# Patient Record
Sex: FEMALE | Race: WHITE
[De-identification: names, ages, dates, MRNs, and addresses within clinical notes are randomized per-mention and may not be internally consistent; named-entity substitution may affect disease eponyms.]

---

## 2018-09-28 NOTE — CR
Barium enema

 

Preliminary view of the abdomen was obtained which is unremarkable.

 

Air contrast barium enema was performed. Colon was outlined from the 

rectosigmoid to the cecum.  No reflux into the terminal ileum is seen.

 

Diverticulosis is seen most severe within the sigmoid colon.  No 

discrete intraluminal polyp is identified.  No annular constricting 

lesion is seen.

 

Impression:

1.  Diverticulosis most severe within sigmoid colon.

2.  No additional abnormality is appreciated on air contrast barium 

enema study.

 

Diagnostic code #2

## 2018-09-28 NOTE — [id number]
Post Anesthesia Note





- EVALUATION WITHIN 48HRS OF ANESTHETIC


Vital Signs in Normal Range: Yes


Patient Participated in Evaluation: Yes


Respiratory Function Stable: Yes


Airway Patent: Yes


Cardiovascular Function Stable: Yes


Hydration Status Stable: Yes


Pain Control Satisfactory: Yes


Nausea and Vomiting Control Satisfactory: Yes


Mental Status Recovered: Yes


Pulse Rate: 68


SaO2: 99


Resp Rate: 16


Temperature: 36.7 C


Blood Pressure: 113/82

## 2018-09-28 NOTE — PCM.PREANE
Preanesthetic Assessment





- Procedure


Proposed Procedure: 





Surveillance colonoscopy





- Anesthesia/Transfusion/Family Hx


Anesthesia History: Prior Anesthesia Without Reaction


Family History of Anesthesia Reaction: No


Transfusion History: No Prior Transfusion(s)


Additional History: 





MS





- Review of Systems


General: No Symptoms


Pulmonary: No Symptoms


Cardiovascular: No Symptoms


Gastrointestinal: No Symptoms


Neurological: No Symptoms


Other: Reports: None





- Physical Assessment


NPO Status Date: 09/27/18


NPO Status Time: 20:30


O2 Sat by Pulse Oximetry: 99


Respiratory Rate: 16


Vital Signs: 





 Last Vital Signs











Temp  36.7 C   09/28/18 06:45


 


Pulse  83   09/28/18 06:45


 


Resp  16   09/28/18 06:45


 


BP  126/76   09/28/18 06:45


 


Pulse Ox  99   09/28/18 06:45











Height: 1.65 m


Weight: 49.895 kg


ASA Class: 2


Mental Status: Alert & Oriented x3


Airway Class: Mallampati = 2


Dentition: Reports: Partial (upper and lower )


Thyro-Mental Finger Breadths: 3


Mouth Opening Finger Breadths: 3


ROM/Head Extension: Full


Lungs: Clear to Auscultation, Normal Respiratory Effort


Cardiovascular: Regular Rate, Regular Rhythm





- Allergies


Allergies/Adverse Reactions: 


 Allergies











Allergy/AdvReac Type Severity Reaction Status Date / Time


 


Penicillins Allergy  Airway Verified 05/12/14 08:31





   Tightness  














- Blood


Blood Available: No


Product(s) Available: None





- Anesthesia Plan


Pre-Op Medication Ordered: None





- Acknowledgements


Anesthesia Type Planned: MAC


Pt an Appropriate Candidate for the Planned Anesthesia: Yes


Alternatives and Risks of Anesthesia Discussed w Pt/Guardian: Yes


Pt/Guardian Understands and Agrees with Anesthesia Plan: Yes





PreAnesthesia Questionnaire


Cardiovascular History: Reports: Other (See Below)


Other Cardiovascular History: Hyperlipidemia


Gastrointestinal History: Reports: Colon Polyp, Diverticulosis


Musculoskeletal History: Reports: Osteoporosis, Other (See Below)


Other Musculoskeletal History: Hx of distal radius fracture


Neurological History: Reports: Other (See Below) (Multiple sclerosis)


Psychiatric History: Reports: Anxiety, Depression





- Past Surgical History


GI Surgical History: Reports: Colonoscopy, EGD


Female  Surgical History: Reports: Cystectomy, Hysterectomy


Musculoskeletal Surgical History: Reports: Other (See Below) (Lt thumb tendon 

repair, wrist surgery)





- HOME MEDS


Home Medications: 


 Home Meds





Glatiramer Acetate [Copaxone] 40 mg PO ASDIRECTED 05/09/14 [History]


Omeprazole 40 mg PO DAILY 05/09/14 [History]


Aspirin [Adult Low Dose Aspirin EC] 81 mg PO DAILY 08/06/18 [History]


OLANZapine [ZyPREXA] 2.5 mg PO DAILY 08/06/18 [History]


Sucralfate 1 gram PO QID 08/06/18 [History]


Venlafaxine HCl [Venlafaxine ER] 75 mg PO DAILY 08/06/18 [History]


Venlafaxine HCl [Venlafaxine ER] 150 mg PO DAILY 08/06/18 [History]


Venlafaxine [Effexor] 75 mg PO DAILY 09/28/18 [History]


clonazePAM [Klonopin] 1 mg PO BID 09/28/18 [History]











- CURRENT (IN HOUSE) MEDS


Current Meds: 





 Current Medications





Lactated Ringer's (Ringers, Lactated)  1,000 mls @ 125 mls/hr IV ASDIRECTED ALONZO


   Stop: 09/28/18 23:00


   Last Admin: 09/28/18 07:05 Dose:  125 mls/hr


Lidocaine/Sodium Bicarbonate (Buffered Lidocaine 1% In Ns 8.4%)  0.25 ml IDERM 

ONETIME PRN


   PRN Reason: Prior to IV Start


   Stop: 09/28/18 18:00


   Last Admin: 09/28/18 07:05 Dose:  0.25 ml


Sodium Chloride (Saline Flush)  10 ml FLUSH ASDIRECTED PRN


   PRN Reason: Keep Vein Open


   Stop: 09/28/18 18:00





Discontinued Medications





Fentanyl (Sublimaze) Confirm Administered Dose 100 mcg .ROUTE .STK-MED ONE


   Stop: 09/28/18 07:26


Lidocaine HCl (Xylocaine-Mpf 1%) Confirm Administered Dose 4 mls @ as directed 

.ROUTE .STK-MED ONE


   Stop: 09/28/18 07:27


Propofol (Diprivan  20 Ml) Confirm Administered Dose 200 mg .ROUTE .STK-MED ONE


   Stop: 09/28/18 07:26

## 2018-09-28 NOTE — PCM.OPNOTE
- General Post-Op/Procedure Note


Date of Surgery/Procedure: 09/28/18


Operative Procedure(s): Surveillance colonoscopy attempted and terminated, with 

polypectomy


Findings: 





Multiple colon sigmoid and rectal polyps 1-4mm, only sampling of polyps taken. 

Small and large diverticulum. Colonoscope that could not pass 20cm.


Pre Op Diagnosis: History of colon polyps and colon stricture


Post-Op Diagnosis: same


Anesthesia Technique: MAC


Primary Surgeon: Zaina Guillaume


Anesthesia Provider: Festus Cazares


Pathology: 





1. Sigmoid polyps x4


2. Rectal polyps x6


Output, Urine Amount: 0


EBL in mLs: 0


Complications: none apparent


Condition: Good

## 2018-09-28 NOTE — PCM.PRNOTE
- Free Text/Narrative


Note: 





Operative Report





Date of Surgery/Procedure: September 28, 2018


Operative Procedure: Surveillance Colonoscopy - aborted, with polypectomy





Pre Op Diagnosis: colorectal cancer screening . for History of colon polyps, 

history of colon stricture


Post-Op Diagnosis: Same





Surgeon: Zaina Guillaume MD


Anesthesia Technique: MAC


Anesthesia Provider: Festus Cazares CRNA





IV Fluid Replacement, Intraop: See anesthesia record


Output, Urine Amount: 0cc


EBL : 0cc





Findings: Multiple colon sigmoid and rectal polyps 1-4mm, only sampling of 

polyps taken. Small and large diverticulum. Colonoscope that could not pass 

20cm.


Specimens: 


1.  Sigmoid polyps 4


2.  Rectal polyps 6





Indication: 


The patient is a 63 year-old lady who presented to the outpatient clinic 

requesting colorectal cancer screening.  The patient has a history of 

diverticulitis with a colonic stricture that was previously dilated.  She also 

has a history of adenomatous colon polyps with high-grade dysplasia.   We 

discussed the procedure of a surveillance colonoscopy including the polypectomy 

and biopsy.  Risks of bleeding and perforation were discussed, the patient 

understood and wished to proceed.  The patient is being followed by GI in 

[place].  I discussed the patient that if we could not pass the scope that she 

would have to proceed with colonoscopy or other tests as indicated by GI in 

[place].  Written and consent was obtained, and the patient agreed to this 

plan.





Description of the procedure:


The patient was brought to the endoscopy suite and placed in the left lateral 

decubitus position. Appropriate monitors were applied. The patient was given 

MAC anesthesia. An anorectal examination was performed, revealing external skin 

tags.  The scope was placed into the rectum and advanced 20cm. at this point, 

the scope would not pass any further.  Increase medication was given and 

abdominal pressure was applied without success. At this point, the scope was 

withdrawn, paying careful attention to the mucosa.  Multiple polyps ranging 

from 1-4 mm were scattered throughout the visualized mucosa.  Four sigmoid 

polyps were removed, additionally 6 Rectal polyps were removed.  There were 

still polyps remaining in the tissues.  After this polypectomy  the scope was 

retroflexed and some hemorrhoidal tissue was seen. The scope was placed back in 

the lumen and the excess air was aspirated.





The patient tolerated the procedure well. 





Complications: none apparent


Condition: Good, transported to PACU in stable condition.  





Instructions: The patient will proceed immediately to a barium enema for better 

visualization of the colon.  I instructed the patient to inform her 

gastroenterologist in [place] of the difficulty that we experienced and the 

testing that we were going to perform so that this physician may follow up 

these tests. She will follow up with me in 2 weeks.





Zaina Guillaume MD


General Surgery

## 2022-05-31 ENCOUNTER — HOSPITAL ENCOUNTER (OUTPATIENT)
Dept: HOSPITAL 41 - JD.SDS | Age: 67
Discharge: HOME | End: 2022-05-31
Attending: OBSTETRICS & GYNECOLOGY
Payer: MEDICARE

## 2022-05-31 DIAGNOSIS — Z98.890: ICD-10-CM

## 2022-05-31 DIAGNOSIS — F32.A: ICD-10-CM

## 2022-05-31 DIAGNOSIS — Z87.891: ICD-10-CM

## 2022-05-31 DIAGNOSIS — Z79.899: ICD-10-CM

## 2022-05-31 DIAGNOSIS — N81.10: Primary | ICD-10-CM

## 2022-05-31 DIAGNOSIS — N81.6: ICD-10-CM

## 2022-05-31 DIAGNOSIS — J44.9: ICD-10-CM

## 2022-05-31 DIAGNOSIS — Z88.0: ICD-10-CM

## 2022-05-31 DIAGNOSIS — Z90.49: ICD-10-CM

## 2022-05-31 PROCEDURE — 86850 RBC ANTIBODY SCREEN: CPT

## 2022-05-31 PROCEDURE — 81003 URINALYSIS AUTO W/O SCOPE: CPT

## 2022-05-31 PROCEDURE — 86901 BLOOD TYPING SEROLOGIC RH(D): CPT

## 2022-05-31 PROCEDURE — 57260 CMBN ANT PST COLPRHY: CPT

## 2022-05-31 PROCEDURE — 36415 COLL VENOUS BLD VENIPUNCTURE: CPT

## 2022-05-31 PROCEDURE — 86900 BLOOD TYPING SEROLOGIC ABO: CPT
